# Patient Record
Sex: FEMALE | Race: BLACK OR AFRICAN AMERICAN | Employment: UNEMPLOYED | URBAN - METROPOLITAN AREA
[De-identification: names, ages, dates, MRNs, and addresses within clinical notes are randomized per-mention and may not be internally consistent; named-entity substitution may affect disease eponyms.]

---

## 2023-02-01 RX ORDER — PREDNISONE 10 MG/1
TABLET ORAL
COMMUNITY
Start: 2021-04-08

## 2023-06-04 ENCOUNTER — HOSPITAL ENCOUNTER (INPATIENT)
Facility: HOSPITAL | Age: 28
LOS: 1 days | Discharge: HOME | DRG: 560 | End: 2023-06-05
Attending: OBSTETRICS & GYNECOLOGY | Admitting: OBSTETRICS & GYNECOLOGY
Payer: MEDICAID

## 2023-06-04 PROBLEM — O47.9 UTERINE CONTRACTIONS: Status: ACTIVE | Noted: 2023-06-04

## 2023-06-04 LAB
ABO + RH BLD: NORMAL
AMNIOTEST, POC: NORMAL
BASOPHILS # BLD: 0 K/UL (ref 0–0.1)
BASOPHILS NFR BLD: 0 % (ref 0–2)
BLOOD GROUP ANTIBODIES SERPL: NORMAL
DIFFERENTIAL METHOD BLD: ABNORMAL
EOSINOPHIL # BLD: 0 K/UL (ref 0–0.4)
EOSINOPHIL NFR BLD: 0 % (ref 0–5)
ERYTHROCYTE [DISTWIDTH] IN BLOOD BY AUTOMATED COUNT: 14.1 % (ref 11.6–14.5)
HCT VFR BLD AUTO: 34 % (ref 35–45)
HGB BLD-MCNC: 10.9 G/DL (ref 12–16)
IMM GRANULOCYTES # BLD AUTO: 0.1 K/UL (ref 0–0.04)
IMM GRANULOCYTES NFR BLD AUTO: 0 % (ref 0–0.5)
LYMPHOCYTES # BLD: 2.6 K/UL (ref 0.9–3.6)
LYMPHOCYTES NFR BLD: 19 % (ref 21–52)
Lab: NORMAL
MCH RBC QN AUTO: 26.2 PG (ref 24–34)
MCHC RBC AUTO-ENTMCNC: 32.1 G/DL (ref 31–37)
MCV RBC AUTO: 81.7 FL (ref 78–100)
MONOCYTES # BLD: 0.7 K/UL (ref 0.05–1.2)
MONOCYTES NFR BLD: 5 % (ref 3–10)
NEGATIVE QC PASS/FAIL: NORMAL
NEUTS SEG # BLD: 10.1 K/UL (ref 1.8–8)
NEUTS SEG NFR BLD: 75 % (ref 40–73)
NRBC # BLD: 0 K/UL (ref 0–0.01)
NRBC BLD-RTO: 0 PER 100 WBC
PLATELET # BLD AUTO: 195 K/UL (ref 135–420)
PMV BLD AUTO: 10.8 FL (ref 9.2–11.8)
POSITIVE QC PASS/FAIL: NORMAL
RBC # BLD AUTO: 4.16 M/UL (ref 4.2–5.3)
SPECIMEN EXP DATE BLD: NORMAL
WBC # BLD AUTO: 13.4 K/UL (ref 4.6–13.2)

## 2023-06-04 PROCEDURE — 6370000000 HC RX 637 (ALT 250 FOR IP): Performed by: ADVANCED PRACTICE MIDWIFE

## 2023-06-04 PROCEDURE — 85025 COMPLETE CBC W/AUTO DIFF WBC: CPT

## 2023-06-04 PROCEDURE — 7220000101 HC DELIVERY VAGINAL/SINGLE

## 2023-06-04 PROCEDURE — 99284 EMERGENCY DEPT VISIT MOD MDM: CPT

## 2023-06-04 PROCEDURE — 6360000002 HC RX W HCPCS

## 2023-06-04 PROCEDURE — 1100000000 HC RM PRIVATE

## 2023-06-04 PROCEDURE — 86901 BLOOD TYPING SEROLOGIC RH(D): CPT

## 2023-06-04 PROCEDURE — 59025 FETAL NON-STRESS TEST: CPT

## 2023-06-04 PROCEDURE — 86850 RBC ANTIBODY SCREEN: CPT

## 2023-06-04 PROCEDURE — 6370000000 HC RX 637 (ALT 250 FOR IP)

## 2023-06-04 PROCEDURE — 86900 BLOOD TYPING SEROLOGIC ABO: CPT

## 2023-06-04 PROCEDURE — 7210000100 HC LABOR FEE PER 1 HR

## 2023-06-04 PROCEDURE — 84112 EVAL AMNIOTIC FLUID PROTEIN: CPT

## 2023-06-04 RX ORDER — ACETAMINOPHEN 325 MG/1
650 TABLET ORAL EVERY 4 HOURS PRN
Status: DISCONTINUED | OUTPATIENT
Start: 2023-06-04 | End: 2023-06-04 | Stop reason: HOSPADM

## 2023-06-04 RX ORDER — MISOPROSTOL 200 UG/1
TABLET ORAL
Status: DISCONTINUED
Start: 2023-06-04 | End: 2023-06-04 | Stop reason: WASHOUT

## 2023-06-04 RX ORDER — SODIUM CHLORIDE 0.9 % (FLUSH) 0.9 %
5-40 SYRINGE (ML) INJECTION PRN
Status: DISCONTINUED | OUTPATIENT
Start: 2023-06-04 | End: 2023-06-05 | Stop reason: HOSPADM

## 2023-06-04 RX ORDER — SODIUM CHLORIDE 9 MG/ML
INJECTION, SOLUTION INTRAVENOUS PRN
Status: DISCONTINUED | OUTPATIENT
Start: 2023-06-04 | End: 2023-06-05 | Stop reason: HOSPADM

## 2023-06-04 RX ORDER — HYDROMORPHONE HYDROCHLORIDE 2 MG/1
1 TABLET ORAL EVERY 6 HOURS PRN
Status: DISCONTINUED | OUTPATIENT
Start: 2023-06-04 | End: 2023-06-05 | Stop reason: HOSPADM

## 2023-06-04 RX ORDER — LIDOCAINE HYDROCHLORIDE 10 MG/ML
INJECTION, SOLUTION EPIDURAL; INFILTRATION; INTRACAUDAL; PERINEURAL
Status: DISCONTINUED
Start: 2023-06-04 | End: 2023-06-04 | Stop reason: WASHOUT

## 2023-06-04 RX ORDER — ACETAMINOPHEN 325 MG/1
650 TABLET ORAL EVERY 4 HOURS PRN
Status: DISCONTINUED | OUTPATIENT
Start: 2023-06-04 | End: 2023-06-05 | Stop reason: HOSPADM

## 2023-06-04 RX ORDER — MAGNESIUM CARB/ALUMINUM HYDROX 105-160MG
TABLET,CHEWABLE ORAL
Status: COMPLETED
Start: 2023-06-04 | End: 2023-06-04

## 2023-06-04 RX ORDER — TRANEXAMIC ACID 10 MG/ML
1000 INJECTION, SOLUTION INTRAVENOUS
Status: DISPENSED | OUTPATIENT
Start: 2023-06-04 | End: 2023-06-05

## 2023-06-04 RX ORDER — IBUPROFEN 400 MG/1
800 TABLET ORAL EVERY 8 HOURS SCHEDULED
Status: DISCONTINUED | OUTPATIENT
Start: 2023-06-04 | End: 2023-06-05 | Stop reason: HOSPADM

## 2023-06-04 RX ORDER — OXYTOCIN 10 [USP'U]/ML
INJECTION, SOLUTION INTRAMUSCULAR; INTRAVENOUS
Status: DISCONTINUED
Start: 2023-06-04 | End: 2023-06-04 | Stop reason: WASHOUT

## 2023-06-04 RX ORDER — MISOPROSTOL 200 UG/1
200 TABLET ORAL PRN
Status: DISCONTINUED | OUTPATIENT
Start: 2023-06-04 | End: 2023-06-05 | Stop reason: HOSPADM

## 2023-06-04 RX ORDER — IBUPROFEN 400 MG/1
800 TABLET ORAL EVERY 8 HOURS
Status: DISCONTINUED | OUTPATIENT
Start: 2023-06-04 | End: 2023-06-04 | Stop reason: SDUPTHER

## 2023-06-04 RX ORDER — LIDOCAINE HYDROCHLORIDE 10 MG/ML
30 INJECTION, SOLUTION EPIDURAL; INFILTRATION; INTRACAUDAL; PERINEURAL PRN
Status: DISCONTINUED | OUTPATIENT
Start: 2023-06-04 | End: 2023-06-05 | Stop reason: HOSPADM

## 2023-06-04 RX ORDER — ONDANSETRON 2 MG/ML
4 INJECTION INTRAMUSCULAR; INTRAVENOUS EVERY 6 HOURS PRN
Status: DISCONTINUED | OUTPATIENT
Start: 2023-06-04 | End: 2023-06-05 | Stop reason: HOSPADM

## 2023-06-04 RX ORDER — SODIUM CHLORIDE 0.9 % (FLUSH) 0.9 %
5-40 SYRINGE (ML) INJECTION EVERY 12 HOURS SCHEDULED
Status: DISCONTINUED | OUTPATIENT
Start: 2023-06-04 | End: 2023-06-05 | Stop reason: HOSPADM

## 2023-06-04 RX ORDER — TRANEXAMIC ACID 10 MG/ML
1000 INJECTION, SOLUTION INTRAVENOUS
Status: DISCONTINUED | OUTPATIENT
Start: 2023-06-04 | End: 2023-06-04 | Stop reason: HOSPADM

## 2023-06-04 RX ORDER — METHYLERGONOVINE MALEATE 0.2 MG/ML
200 INJECTION INTRAVENOUS PRN
Status: DISCONTINUED | OUTPATIENT
Start: 2023-06-04 | End: 2023-06-04 | Stop reason: HOSPADM

## 2023-06-04 RX ORDER — DOCUSATE SODIUM 100 MG/1
100 CAPSULE, LIQUID FILLED ORAL 2 TIMES DAILY
Status: DISCONTINUED | OUTPATIENT
Start: 2023-06-04 | End: 2023-06-05 | Stop reason: HOSPADM

## 2023-06-04 RX ORDER — TERBUTALINE SULFATE 1 MG/ML
0.25 INJECTION, SOLUTION SUBCUTANEOUS
Status: DISCONTINUED | OUTPATIENT
Start: 2023-06-04 | End: 2023-06-04 | Stop reason: HOSPADM

## 2023-06-04 RX ORDER — ONDANSETRON 2 MG/ML
4 INJECTION INTRAMUSCULAR; INTRAVENOUS EVERY 6 HOURS PRN
Status: DISCONTINUED | OUTPATIENT
Start: 2023-06-04 | End: 2023-06-04 | Stop reason: HOSPADM

## 2023-06-04 RX ORDER — CARBOPROST TROMETHAMINE 250 UG/ML
250 INJECTION, SOLUTION INTRAMUSCULAR PRN
Status: DISCONTINUED | OUTPATIENT
Start: 2023-06-04 | End: 2023-06-04 | Stop reason: HOSPADM

## 2023-06-04 RX ORDER — MISOPROSTOL 200 UG/1
800 TABLET ORAL PRN
Status: DISCONTINUED | OUTPATIENT
Start: 2023-06-04 | End: 2023-06-04 | Stop reason: HOSPADM

## 2023-06-04 RX ORDER — LANOLIN/MINERAL OIL
LOTION (ML) TOPICAL PRN
Status: DISCONTINUED | OUTPATIENT
Start: 2023-06-04 | End: 2023-06-05 | Stop reason: HOSPADM

## 2023-06-04 RX ADMIN — ACETAMINOPHEN 650 MG: 325 TABLET ORAL at 10:30

## 2023-06-04 RX ADMIN — ACETAMINOPHEN 650 MG: 325 TABLET ORAL at 19:54

## 2023-06-04 RX ADMIN — DOCUSATE SODIUM 100 MG: 100 CAPSULE, LIQUID FILLED ORAL at 19:54

## 2023-06-04 RX ADMIN — MINERAL OIL 30 ML: 1000 SOLUTION ORAL at 01:48

## 2023-06-04 RX ADMIN — DOCUSATE SODIUM 100 MG: 100 CAPSULE, LIQUID FILLED ORAL at 08:46

## 2023-06-04 RX ADMIN — Medication 166.7 ML: at 03:03

## 2023-06-04 RX ADMIN — IBUPROFEN 800 MG: 400 TABLET, FILM COATED ORAL at 14:38

## 2023-06-04 RX ADMIN — IBUPROFEN 800 MG: 400 TABLET, FILM COATED ORAL at 02:49

## 2023-06-04 ASSESSMENT — PAIN - FUNCTIONAL ASSESSMENT
PAIN_FUNCTIONAL_ASSESSMENT: ACTIVITIES ARE NOT PREVENTED
PAIN_FUNCTIONAL_ASSESSMENT: ACTIVITIES ARE NOT PREVENTED

## 2023-06-04 ASSESSMENT — PAIN SCALES - GENERAL
PAINLEVEL_OUTOF10: 4
PAINLEVEL_OUTOF10: 3
PAINLEVEL_OUTOF10: 5

## 2023-06-04 ASSESSMENT — PAIN DESCRIPTION - ORIENTATION: ORIENTATION: LOWER

## 2023-06-04 ASSESSMENT — PAIN DESCRIPTION - LOCATION
LOCATION: ABDOMEN;VAGINA
LOCATION: ABDOMEN

## 2023-06-04 ASSESSMENT — PAIN DESCRIPTION - DESCRIPTORS: DESCRIPTORS: CRAMPING

## 2023-06-05 VITALS
RESPIRATION RATE: 18 BRPM | TEMPERATURE: 98.1 F | DIASTOLIC BLOOD PRESSURE: 65 MMHG | SYSTOLIC BLOOD PRESSURE: 113 MMHG | OXYGEN SATURATION: 100 % | HEART RATE: 90 BPM

## 2023-06-05 LAB
HCT VFR BLD AUTO: 32.7 % (ref 35–45)
HGB BLD-MCNC: 10.4 G/DL (ref 12–16)

## 2023-06-05 PROCEDURE — 36415 COLL VENOUS BLD VENIPUNCTURE: CPT

## 2023-06-05 PROCEDURE — 85014 HEMATOCRIT: CPT

## 2023-06-05 PROCEDURE — 85018 HEMOGLOBIN: CPT

## 2023-06-05 PROCEDURE — 6370000000 HC RX 637 (ALT 250 FOR IP): Performed by: ADVANCED PRACTICE MIDWIFE

## 2023-06-05 RX ORDER — IBUPROFEN 800 MG/1
800 TABLET ORAL 4 TIMES DAILY PRN
Qty: 90 TABLET | Refills: 0 | Status: SHIPPED | OUTPATIENT
Start: 2023-06-05

## 2023-06-05 RX ORDER — FERROUS SULFATE 325(65) MG
325 TABLET ORAL 2 TIMES DAILY
Qty: 60 TABLET | Refills: 2 | Status: SHIPPED | OUTPATIENT
Start: 2023-06-05

## 2023-06-05 RX ORDER — IBUPROFEN 800 MG/1
800 TABLET ORAL EVERY 8 HOURS SCHEDULED
Qty: 120 TABLET | Refills: 3 | Status: SHIPPED | OUTPATIENT
Start: 2023-06-05

## 2023-06-05 RX ADMIN — DOCUSATE SODIUM 100 MG: 100 CAPSULE, LIQUID FILLED ORAL at 08:12

## 2023-06-05 ASSESSMENT — PAIN SCALES - GENERAL
PAINLEVEL_OUTOF10: 2
PAINLEVEL_OUTOF10: 2

## 2023-06-05 NOTE — LACTATION NOTE
This note was copied from a baby's chart. 23 0928   Visit Information   Lactation Consult Visit Type IP Initial Consult   Visit Length 30 minutes   Referral Received From Referred by MD   Reason for Visit Education;Normal  Visit   Breast Feeding History/Assessment   Breastfeeding History No     Mom educated on breastfeeding basics--hunger cues, feeding on demand, waking baby if baby sleeps too long between feeds, importance of skin to skin, positioning and latching, risk of pacifier use and supplemental feedings, and importance of rooming in--and use of log sheet. Mom also educated on benefits of breastfeeding for herself and baby. Mom verbalized understanding. No questions at this time. Per mom, infant latches and nurses well. Mom stated \"some pain felt\" while nursing. LC noted possible lip tie. Encouraged to call for assistance with next feeding. Mom verbalized understanding. 18 mom is attempting to get  latched. Mom stating the latch is painful. LC noted the upper lip is rolling in. Attempted to fix the lip causing the  to come off. Attempted to use a 20 mm nipple shield. Mom stating latch is too painful. Hand expression education completed with mom and handout with spoon given for reinforcement. Showed how to feed infant expressed colostrum with spoon. Mom verbalized understanding and no questions at this time. Mom easily expressed 2 full spoons. [facility] taught mom how to spoon feed. Provided mom with gel soothie pads. Notified LPN.

## 2023-06-05 NOTE — PROGRESS NOTES
Progress Note    Patient: Joe Campbell MRN: [id number]  SSN: [id number]    YOB: 1995  Age: 24 y.o. Sex: female      Subjective:     Postpartum Day: 1     Delivery: vaginal delivery    The patient feels well. The patient denies emotional concerns. The baby iswell. Baby is feeding via both breast and bottle . The patient both labs and BP are within the desired range, prescription will be faxed electronically for a supply adequate to last until 6 month recheck appointment ambulating well. The patient  tolerating a normal diet. Flatus has been passed. Objective:      Patient Vitals for the past 8 hrs:   BP Temp Temp src Pulse Resp SpO2   06/05/23 0745 104/71 98 °F (36.7 °C) Oral 95 16 98 %   06/05/23 0348 108/62 97.7 °F (36.5 °C) Oral 80 14 100 %     LABS: Recent Results (from the past 24 hour(s))   Hemoglobin and Hematocrit    Collection Time: 06/05/23  7:16 AM   Result Value Ref Range    Hemoglobin 10.4 (L) 12.0 - 16.0 g/dL    Hematocrit 32.7 (L) 35.0 - 45.0 %          Lochia:  appropriate   Uterine Fundus:   firm   Fundus Location:  -1   Incision:  no significant drainage   DVT Evaluation:  No evidence of DVT seen on physical exam.     Lab/Data Review: All lab results for the last 24 hours reviewed. Assessment:     Status post: Doing well postpartum vaginal delivery     Plan:     Postpartum care discussed including diet, ambulation, and actvitiy restrictions. Discharge instructions and questions answered for vaginal delivery.     Signed By: Brittani Miller MD     June 5, 2023

## 2023-06-05 NOTE — PLAN OF CARE
Problem: Pain  Goal: Verbalizes/displays adequate comfort level or baseline comfort level  2023 1102 by Kassie Hood LPN  Outcome: Progressing  Flowsheets (Taken 2023 0750)  Verbalizes/displays adequate comfort level or baseline comfort level:   Encourage patient to monitor pain and request assistance   Assess pain using appropriate pain scale   Administer analgesics based on type and severity of pain and evaluate response   Implement non-pharmacological measures as appropriate and evaluate response  2023 by Nicole Cai RN  Outcome: Progressing  Flowsheets (Taken 2023 1954)  Verbalizes/displays adequate comfort level or baseline comfort level:   Assess pain using appropriate pain scale   Encourage patient to monitor pain and request assistance     Problem: Postpartum  Goal: Experiences normal postpartum course  Description:  Postpartum OB-Pregnancy care plan goal which identifies if the mother is experiencing a normal postpartum course  20232 by Kassie Hood LPN  Outcome: Progressing  2023 by Nicole Cai RN  Outcome: Progressing  Goal: Appropriate maternal -  bonding  Description:  Postpartum OB-Pregnancy care plan goal which identifies if the mother and  are bonding appropriately  2023 1102 by Kassie Hood LPN  Outcome: Progressing  2023 by Nicole Cai RN  Outcome: Progressing  Goal: Establishment of infant feeding pattern  Description:  Postpartum OB-Pregnancy care plan goal which identifies if the mother is establishing a feeding pattern with their   20232 by Kassie Hood LPN  Outcome: Progressing  2023 by Nicole Cai RN  Outcome: Progressing     Problem: Infection - Adult  Goal: Absence of infection at discharge  20232 by Kassie Hood LPN  Outcome: Progressing  2023 by Nicole Cai RN  Outcome: Progressing  Goal: Absence of infection during
Problem: Pain  Goal: Verbalizes/displays adequate comfort level or baseline comfort level  2023 1632 by Denny Marley LPN  Outcome: Completed  2023 1102 by Denny Marley LPN  Outcome: Progressing  Flowsheets (Taken 2023 0750)  Verbalizes/displays adequate comfort level or baseline comfort level:   Encourage patient to monitor pain and request assistance   Assess pain using appropriate pain scale   Administer analgesics based on type and severity of pain and evaluate response   Implement non-pharmacological measures as appropriate and evaluate response     Problem: Postpartum  Goal: Experiences normal postpartum course  Description:  Postpartum OB-Pregnancy care plan goal which identifies if the mother is experiencing a normal postpartum course  2023 1632 by Denny Marley LPN  Outcome: Completed  2023 1102 by Denny Marley LPN  Outcome: Progressing  Goal: Appropriate maternal -  bonding  Description:  Postpartum OB-Pregnancy care plan goal which identifies if the mother and  are bonding appropriately  2023 1632 by Denny Marley LPN  Outcome: Completed  2023 1102 by Denny Marley LPN  Outcome: Progressing  Goal: Establishment of infant feeding pattern  Description:  Postpartum OB-Pregnancy care plan goal which identifies if the mother is establishing a feeding pattern with their   2023 1632 by Denny Marley LPN  Outcome: Completed  2023 1102 by Denny Marley LPN  Outcome: Progressing     Problem: Infection - Adult  Goal: Absence of infection at discharge  2023 1632 by Denny Marley LPN  Outcome: Completed  2023 1102 by eDnny Marley LPN  Outcome: Progressing  Goal: Absence of infection during hospitalization  2023 1632 by Denny Marley LPN  Outcome: Completed  2023 1102 by Denny Marley LPN  Outcome: Progressing  Goal: Absence of fever/infection during anticipated neutropenic period  2023 1632
  2023 by Mason Amaya RN  Outcome: Progressing  2023 0918 by Too Gandara RN  Outcome: Progressing  2023 09 by Too Gandara RN  Outcome: Progressing     Problem: Infection - Adult  Goal: Absence of infection at discharge  2023 by Mason Amaya RN  Outcome: Progressing  2023 0918 by Too Gandara RN  Outcome: Progressing  2023 09 by Too Gandara RN  Outcome: Progressing  Goal: Absence of infection during hospitalization  2023 by Mason Amaya RN  Outcome: Progressing  2023 0918 by Too Gandara RN  Outcome: Progressing  2023 09 by Too Gandara RN  Outcome: Progressing  Goal: Absence of fever/infection during anticipated neutropenic period  2023 by Mason Amaya RN  Outcome: Progressing  2023 0918 by Too Gandara RN  Outcome: Progressing  2023 09 by Too Gandara RN  Outcome: Progressing     Problem: Safety - Adult  Goal: Free from fall injury  2023 by Mason Amaya RN  Outcome: Progressing  2023 0918 by Too Gandara RN  Outcome: Progressing  2023 09 by Too Gandara RN  Outcome: Progressing     Problem: Discharge Planning  Goal: Discharge to home or other facility with appropriate resources  2023 by Mason Amaya RN  Outcome: Progressing  2023 0918 by Too Gandara RN  Outcome: Progressing  2023 09 by Too Gandara RN  Outcome: Progressing

## 2023-06-05 NOTE — DISCHARGE SUMMARY
Take 1 tablet by mouth every 8 hours  Qty: 120 tablet, Refills: 3      ferrous sulfate (IRON 325) 325 (65 Fe) MG tablet Take 1 tablet by mouth 2 times daily  Qty: 60 tablet, Refills: 2           STOP taking these medications       predniSONE 10 MG (21) TBPK Comments:   Reason for Stopping:               Reference my discharge instructions. * Follow-up Care/Patient Instructions: Activity: activity as tolerated, no sex for 6 weeks, and no heavy lifting for 6 weeks  Diet: regular diet  Wound Care: none needed     Follow-up Information     Cem Joseph MD. Schedule an appointment as soon as possible for a visit   in 6 week(s).     Specialties: Obstetrics & Gynecology, Gynecology, Obstetrics  Why: postpartum follow up  Contact information:  [address] [place] [address]  [phone]                        Signed By:  Leanord Sacks, APRN - CNM     June 5, 2023

## 2023-06-05 NOTE — DISCHARGE INSTRUCTIONS
POST DELIVERY DISCHARGE INSTRUCTIONS    Name: Karina Luevano  YOB: 1995  Primary Diagnosis: [unfilled]    General:     Diet/Diet Restrictions:  Eight 8-ounce glasses of fluid daily (water, juices); avoid excessive caffeine intake. Meals/snacks as desired which are high in fiber and carbohydrates and low in fat and cholesterol. Physical Activity / Restrictions / Safety:     Avoid heavy lifting, no more that 8 lbs. For 2-3 weeks; limit use of stairs to 2 times daily for the first week home. No driving for one week. Avoid intercourse 4-6 weeks, no douching or tampon use. Check with obstetrician before starting or resuming an exercise program.         Discharge Instructions/Special Treatment/Home Care Needs:     Continue prenatal vitamins. Continue to use squirt bottle with warm water on your episiotomy after each bathroom use until bleeding stops. Call your doctor for the following:     Fever over 100.4 degrees by mouth. Vaginal bleeding heavier than a normal menstrual period or clot larger than a golf ball. Red streaks or increased swelling of legs, painful red streaks on your breast.  Painful urination, constipation and increased pain or swelling or discharge with your incision. If you feel extremely anxious or overwhelmed. If you have thoughts of harming yourself and/or your baby. Pain Management:     Pain Management:   Take Acetaminophen (Tylenol) or Ibuprofen (Advil, Motrin), as directed for pain. Use a warm Sitz bath 3 times daily to relieve episiotomy or hemorrhoidal discomfort. Heating pad to  incision as needed. For hemorrhoidal discomfort, use Tucks and Anusol cream as needed and directed. Follow-Up Care: These are general instructions for a healthy lifestyle:    No smoking/ No tobacco products/ Avoid exposure to second hand smoke    Surgeon General's Warning:  Quitting smoking now greatly reduces serious risk to your health.     Obesity, smoking, and